# Patient Record
Sex: FEMALE | URBAN - METROPOLITAN AREA
[De-identification: names, ages, dates, MRNs, and addresses within clinical notes are randomized per-mention and may not be internally consistent; named-entity substitution may affect disease eponyms.]

---

## 2017-09-05 ENCOUNTER — IMPORTED ENCOUNTER (OUTPATIENT)
Dept: URBAN - METROPOLITAN AREA CLINIC 9 | Facility: CLINIC | Age: 42
End: 2017-09-05

## 2017-10-17 ENCOUNTER — IMPORTED ENCOUNTER (OUTPATIENT)
Dept: URBAN - METROPOLITAN AREA CLINIC 9 | Facility: CLINIC | Age: 42
End: 2017-10-17

## 2017-12-12 ENCOUNTER — IMPORTED ENCOUNTER (OUTPATIENT)
Dept: URBAN - METROPOLITAN AREA CLINIC 9 | Facility: CLINIC | Age: 42
End: 2017-12-12

## 2018-01-16 ENCOUNTER — IMPORTED ENCOUNTER (OUTPATIENT)
Dept: URBAN - METROPOLITAN AREA CLINIC 9 | Facility: CLINIC | Age: 43
End: 2018-01-16

## 2018-01-23 ENCOUNTER — IMPORTED ENCOUNTER (OUTPATIENT)
Dept: URBAN - METROPOLITAN AREA CLINIC 9 | Facility: CLINIC | Age: 43
End: 2018-01-23

## 2018-03-13 ENCOUNTER — IMPORTED ENCOUNTER (OUTPATIENT)
Dept: URBAN - METROPOLITAN AREA CLINIC 9 | Facility: CLINIC | Age: 43
End: 2018-03-13

## 2018-11-05 NOTE — PATIENT DISCUSSION
"The patient is given the patient education document: ""[facility]""

## 2019-04-16 ENCOUNTER — IMPORTED ENCOUNTER (OUTPATIENT)
Dept: URBAN - METROPOLITAN AREA CLINIC 9 | Facility: CLINIC | Age: 44
End: 2019-04-16

## 2019-06-17 ENCOUNTER — IMPORTED ENCOUNTER (OUTPATIENT)
Dept: URBAN - METROPOLITAN AREA CLINIC 9 | Facility: CLINIC | Age: 44
End: 2019-06-17

## 2020-06-19 ENCOUNTER — IMPORTED ENCOUNTER (OUTPATIENT)
Dept: URBAN - METROPOLITAN AREA CLINIC 9 | Facility: CLINIC | Age: 45
End: 2020-06-19

## 2021-10-16 ASSESSMENT — VISUAL ACUITY
OD_SC: 20/25 + SN
OD_SC: 20/20 SN
OD_SC: 20/25 SN
OS_CC: 20/20 SN
OD_CC: 20/25 SN
OS_SC: 20/100 SN
OD_SC: 20/30 SN
OS_SC: 20/25 SN
OD_SC: 20/25 SN
OS_CC: 20/25 SN
OS_SC: 20/60 SN
OD_SC: 20/25 + SN
OD_CC: 20/20 SN
OS_SC: 20/25 - SN
OS_SC: 20/25 SN
OS_SC: 20/70 - SN
OS_SC: 20/40 SN
OD_SC: 20/20 -2 SN
OS_SC: 20/20 -2 SN
OD_SC: 20/20 - SN
OS_PH: 20/40 - SN
OD_SC: 20/20 SN
OS_SC: 20/25 - SN
OS_PH: 20/30 SN

## 2021-10-16 ASSESSMENT — TONOMETRY
OS_IOP_MMHG: 14
OD_IOP_MMHG: 21
OD_IOP_MMHG: 16
OD_IOP_MMHG: 17
OS_IOP_MMHG: 17
OS_IOP_MMHG: 19
OD_IOP_MMHG: 14
OD_IOP_MMHG: 18
OS_IOP_MMHG: 15
OS_IOP_MMHG: 14

## 2022-07-01 RX ORDER — TELMISARTAN 80 MG/1
TABLET ORAL
COMMUNITY

## 2022-07-01 RX ORDER — LEVOTHYROXINE SODIUM 0.12 MG/1
TABLET ORAL
COMMUNITY

## 2022-07-01 RX ORDER — RIVAROXABAN 10 MG/1
TABLET, FILM COATED ORAL
COMMUNITY

## 2022-07-01 RX ORDER — QUETIAPINE FUMARATE 25 MG/1
TABLET, FILM COATED ORAL
COMMUNITY

## 2022-07-01 RX ORDER — CYCLOSPORINE 0.5 MG/ML
EMULSION OPHTHALMIC
COMMUNITY
Start: 2022-05-16

## 2022-07-01 RX ORDER — FERROUS SULFATE 325(65) MG
TABLET ORAL
COMMUNITY

## 2022-07-01 RX ORDER — CLONAZEPAM 0.5 MG/1
TABLET ORAL
COMMUNITY

## 2022-07-01 RX ORDER — POTASSIUM CHLORIDE 20 MEQ/1
TABLET, EXTENDED RELEASE ORAL
COMMUNITY

## 2022-07-01 RX ORDER — FUROSEMIDE 40 MG/1
TABLET ORAL
COMMUNITY
End: 2022-10-04 | Stop reason: SDUPTHER

## 2022-07-01 RX ORDER — LAMOTRIGINE 100 MG/1
TABLET ORAL
COMMUNITY

## 2022-07-01 RX ORDER — TRETINOIN 0.25 MG/G
GEL TOPICAL
COMMUNITY
Start: 2022-05-16

## 2022-08-10 PROBLEM — Z98.890 STATUS POST ARTHROSCOPY OF RIGHT SHOULDER: Status: ACTIVE | Noted: 2022-08-10

## 2022-09-01 PROBLEM — R21 RASH: Status: ACTIVE | Noted: 2022-09-01

## 2022-09-01 PROBLEM — I87.2 CHRONIC VENOUS INSUFFICIENCY: Status: ACTIVE | Noted: 2022-09-01

## 2022-09-01 PROBLEM — M54.9 BACKACHE: Status: ACTIVE | Noted: 2022-09-01

## 2022-09-01 PROBLEM — F90.9 ATTENTION DEFICIT HYPERACTIVITY DISORDER: Status: ACTIVE | Noted: 2022-09-01

## 2022-09-01 PROBLEM — M20.21 HALLUX RIGIDUS OF RIGHT FOOT: Status: ACTIVE | Noted: 2022-09-01

## 2022-09-01 PROBLEM — M79.673 FOOT PAIN: Status: ACTIVE | Noted: 2022-09-01

## 2022-09-01 PROBLEM — M47.817 LUMBOSACRAL SPONDYLOSIS WITHOUT MYELOPATHY: Status: ACTIVE | Noted: 2022-09-01

## 2022-09-01 PROBLEM — F32.9 MAJOR DEPRESSIVE DISORDER WITH CURRENT ACTIVE EPISODE: Status: ACTIVE | Noted: 2022-09-01

## 2022-09-01 PROBLEM — N64.89 BREAST ASYMMETRY: Status: ACTIVE | Noted: 2022-09-01

## 2022-09-01 PROBLEM — M75.120 FULL THICKNESS ROTATOR CUFF TEAR: Status: ACTIVE | Noted: 2022-01-13

## 2022-09-01 PROBLEM — F31.9 BIPOLAR AFFECTIVE DISORDER (HCC): Status: ACTIVE | Noted: 2022-09-01

## 2022-09-01 PROBLEM — E66.01 OBESITY, MORBID, BMI 40.0-49.9 (HCC): Status: ACTIVE | Noted: 2022-09-01

## 2022-09-01 PROBLEM — R60.9 EDEMA: Status: ACTIVE | Noted: 2022-09-01

## 2022-09-01 PROBLEM — H53.9 VISUAL DISTURBANCE: Status: ACTIVE | Noted: 2022-09-01

## 2022-09-01 PROBLEM — M19.019 ARTHRITIS OF STERNOCLAVICULAR JOINT: Status: ACTIVE | Noted: 2022-09-01

## 2022-09-01 PROBLEM — G89.29 CHRONIC KNEE PAIN AFTER TOTAL REPLACEMENT OF LEFT KNEE JOINT: Status: ACTIVE | Noted: 2021-11-18

## 2022-09-01 PROBLEM — G56.30 RADIAL TUNNEL SYNDROME: Status: ACTIVE | Noted: 2022-09-01

## 2022-09-01 PROBLEM — Z96.652 CHRONIC KNEE PAIN AFTER TOTAL REPLACEMENT OF LEFT KNEE JOINT: Status: ACTIVE | Noted: 2021-11-18

## 2022-09-01 PROBLEM — M21.6X2 OTHER ACQUIRED DEFORMITIES OF LEFT FOOT: Status: ACTIVE | Noted: 2022-09-01

## 2022-09-01 PROBLEM — M25.579 ANKLE PAIN: Status: ACTIVE | Noted: 2022-09-01

## 2022-09-01 PROBLEM — F43.10 POSTTRAUMATIC STRESS DISORDER: Status: ACTIVE | Noted: 2022-09-01

## 2022-09-01 PROBLEM — M50.90 DISORDER OF INTERVERTEBRAL DISC OF CERVICAL SPINE: Status: ACTIVE | Noted: 2022-09-01

## 2022-09-01 PROBLEM — M72.2 PLANTAR FASCIAL FIBROMATOSIS: Status: ACTIVE | Noted: 2022-09-01

## 2022-09-01 PROBLEM — D50.9 IRON DEFICIENCY ANEMIA: Status: ACTIVE | Noted: 2022-09-01

## 2022-09-01 PROBLEM — K82.4 CHOLESTEROLOSIS OF GALLBLADDER: Status: ACTIVE | Noted: 2022-09-01

## 2022-09-01 PROBLEM — M25.519 SHOULDER PAIN: Status: ACTIVE | Noted: 2022-01-13

## 2022-09-01 PROBLEM — K21.9 GASTROESOPHAGEAL REFLUX DISEASE: Status: ACTIVE | Noted: 2022-09-01

## 2022-09-01 PROBLEM — G56.23 CUBITAL TUNNEL SYNDROME, BILATERAL: Status: ACTIVE | Noted: 2022-09-01

## 2022-09-01 PROBLEM — N92.6 IRREGULAR PERIODS/MENSTRUAL CYCLES: Status: ACTIVE | Noted: 2022-09-01

## 2022-09-01 PROBLEM — E78.5 HYPERLIPIDEMIA: Status: ACTIVE | Noted: 2022-09-01

## 2022-09-01 PROBLEM — M77.9 TENDINITIS: Status: ACTIVE | Noted: 2022-09-01

## 2022-09-01 PROBLEM — M92.61 HAGLUND'S DEFORMITY OF RIGHT HEEL: Status: ACTIVE | Noted: 2022-09-01

## 2022-09-01 PROBLEM — E87.6 HYPOKALEMIA: Status: ACTIVE | Noted: 2022-09-01

## 2022-09-01 PROBLEM — R56.9 SEIZURE (HCC): Status: ACTIVE | Noted: 2022-09-01

## 2022-09-01 PROBLEM — F41.9 ANXIETY: Status: ACTIVE | Noted: 2022-09-01

## 2022-09-01 PROBLEM — E66.9 OBESITY: Status: ACTIVE | Noted: 2022-09-01

## 2022-09-01 PROBLEM — F33.2 SEVERE EPISODE OF RECURRENT MAJOR DEPRESSIVE DISORDER, WITHOUT PSYCHOTIC FEATURES (HCC): Status: ACTIVE | Noted: 2022-09-01

## 2022-09-01 PROBLEM — M20.10 ACQUIRED HALLUX VALGUS: Status: ACTIVE | Noted: 2022-09-01

## 2022-09-01 PROBLEM — M21.6X1 OTHER ACQUIRED DEFORMITIES OF RIGHT FOOT: Status: ACTIVE | Noted: 2022-09-01

## 2022-09-01 PROBLEM — F31.9 BIPOLAR DISORDER (HCC): Status: ACTIVE | Noted: 2022-09-01

## 2022-09-01 PROBLEM — Z96.659 ARTIFICIAL KNEE JOINT PRESENT: Status: ACTIVE | Noted: 2022-09-01

## 2022-09-01 PROBLEM — B35.1 ONYCHOMYCOSIS: Status: ACTIVE | Noted: 2022-09-01

## 2022-09-01 PROBLEM — M75.121 NONTRAUMATIC COMPLETE TEAR OF RIGHT ROTATOR CUFF: Status: ACTIVE | Noted: 2022-09-01

## 2022-09-01 PROBLEM — G56.02 CARPAL TUNNEL SYNDROME ON LEFT: Status: ACTIVE | Noted: 2022-09-01

## 2022-09-01 PROBLEM — M47.9 SPONDYLOSIS: Status: ACTIVE | Noted: 2022-09-01

## 2022-09-01 PROBLEM — G47.33 OSA (OBSTRUCTIVE SLEEP APNEA): Status: ACTIVE | Noted: 2022-09-01

## 2022-09-01 PROBLEM — G56.20 LESION OF ULNAR NERVE: Status: ACTIVE | Noted: 2022-09-01

## 2022-09-01 PROBLEM — M20.12 HALLUX VALGUS (ACQUIRED), LEFT FOOT: Status: ACTIVE | Noted: 2022-09-01

## 2022-09-01 PROBLEM — M72.2 PLANTAR FASCIITIS: Status: ACTIVE | Noted: 2022-09-01

## 2022-09-01 PROBLEM — R53.83 FATIGUE: Status: ACTIVE | Noted: 2022-09-01

## 2022-09-01 PROBLEM — N93.9 ABNORMAL UTERINE BLEEDING (AUB): Status: ACTIVE | Noted: 2022-09-01

## 2022-09-01 PROBLEM — M47.816 SPONDYLOSIS OF LUMBAR SPINE: Status: ACTIVE | Noted: 2022-09-01

## 2022-09-01 PROBLEM — M20.11 HALLUX VALGUS OF RIGHT FOOT: Status: ACTIVE | Noted: 2022-09-01

## 2022-09-01 PROBLEM — M17.0 ARTHRITIS OF BOTH KNEES: Status: ACTIVE | Noted: 2022-09-01

## 2022-09-01 PROBLEM — R92.8 ABNORMAL FINDINGS ON DIAGNOSTIC IMAGING OF BREAST: Status: ACTIVE | Noted: 2022-09-01

## 2022-09-01 PROBLEM — M25.562 CHRONIC KNEE PAIN AFTER TOTAL REPLACEMENT OF LEFT KNEE JOINT: Status: ACTIVE | Noted: 2021-11-18

## 2022-09-01 PROBLEM — H57.89 REDNESS OF BOTH EYES: Status: ACTIVE | Noted: 2022-09-01

## 2022-09-01 PROBLEM — M19.90 OSTEOARTHRITIS: Status: ACTIVE | Noted: 2022-09-01

## 2022-09-01 PROBLEM — G56.00 CARPAL TUNNEL SYNDROME: Status: ACTIVE | Noted: 2022-09-01

## 2022-09-01 PROBLEM — J30.9 ALLERGIC RHINITIS: Status: ACTIVE | Noted: 2022-09-01

## 2022-09-01 PROBLEM — G47.419 PRIMARY NARCOLEPSY WITHOUT CATAPLEXY: Status: ACTIVE | Noted: 2022-09-01

## 2022-09-01 PROBLEM — M25.476 SWELLING OF FIRST METATARSOPHALANGEAL (MTP) JOINT: Status: ACTIVE | Noted: 2022-09-01

## 2022-09-01 PROBLEM — M25.561 PAIN IN JOINT OF RIGHT KNEE: Status: ACTIVE | Noted: 2021-11-18

## 2022-09-01 PROBLEM — J40 BRONCHITIS: Status: ACTIVE | Noted: 2022-09-01

## 2022-09-01 PROBLEM — L81.9 ABNORMAL PIGMENTATION OF SKIN: Status: ACTIVE | Noted: 2022-09-01

## 2022-09-01 PROBLEM — R30.0 DYSURIA: Status: ACTIVE | Noted: 2022-09-01

## 2022-09-01 PROBLEM — D64.9 ANEMIA: Status: ACTIVE | Noted: 2022-09-01

## 2022-09-01 PROBLEM — M77.11 LATERAL EPICONDYLITIS OF RIGHT ELBOW: Status: ACTIVE | Noted: 2022-09-01

## 2022-09-01 PROBLEM — M47.22 CERVICAL SPONDYLOSIS WITH RADICULOPATHY: Status: ACTIVE | Noted: 2022-09-01

## 2022-09-01 PROBLEM — F50.81 BINGE EATING DISORDER: Status: ACTIVE | Noted: 2022-09-01

## 2022-09-01 PROBLEM — E03.9 HYPOTHYROIDISM: Status: ACTIVE | Noted: 2022-09-01

## 2022-09-01 PROBLEM — M22.2X9 PATELLOFEMORAL STRESS SYNDROME: Status: ACTIVE | Noted: 2021-11-18

## 2022-09-01 PROBLEM — E55.9 VITAMIN D DEFICIENCY: Status: ACTIVE | Noted: 2022-09-01

## 2022-09-01 PROBLEM — G56.01 CARPAL TUNNEL SYNDROME OF RIGHT WRIST: Status: ACTIVE | Noted: 2022-09-01

## 2022-09-01 PROBLEM — I10 HYPERTENSION: Status: ACTIVE | Noted: 2022-09-01

## 2022-09-01 PROBLEM — Z96.659 HISTORY OF TOTAL KNEE REPLACEMENT: Status: ACTIVE | Noted: 2021-11-18

## 2022-09-01 PROBLEM — M17.9 OSTEOARTHRITIS OF KNEE: Status: ACTIVE | Noted: 2021-11-18

## 2022-09-01 PROBLEM — D72.819 LEUKOPENIA: Status: ACTIVE | Noted: 2022-09-01

## 2022-09-06 PROBLEM — Z98.890 STATUS POST ARTHROSCOPY OF RIGHT SHOULDER: Status: ACTIVE | Noted: 2022-09-06

## 2022-09-15 PROBLEM — M25.519 SHOULDER PAIN: Status: RESOLVED | Noted: 2022-01-13 | Resolved: 2022-09-15

## 2022-09-15 PROBLEM — M25.579 ANKLE PAIN: Status: RESOLVED | Noted: 2022-09-01 | Resolved: 2022-09-15

## 2022-09-15 PROBLEM — E66.9 OBESITY: Status: RESOLVED | Noted: 2022-09-01 | Resolved: 2022-09-15

## 2022-09-15 PROBLEM — F33.2 SEVERE EPISODE OF RECURRENT MAJOR DEPRESSIVE DISORDER, WITHOUT PSYCHOTIC FEATURES (HCC): Status: RESOLVED | Noted: 2022-09-01 | Resolved: 2022-09-15

## 2022-09-15 PROBLEM — D64.9 ANEMIA: Status: RESOLVED | Noted: 2022-09-01 | Resolved: 2022-09-15

## 2022-09-15 PROBLEM — M75.120 FULL THICKNESS ROTATOR CUFF TEAR: Status: RESOLVED | Noted: 2022-01-13 | Resolved: 2022-09-15

## 2022-09-15 PROBLEM — M21.6X1 OTHER ACQUIRED DEFORMITIES OF RIGHT FOOT: Status: RESOLVED | Noted: 2022-09-01 | Resolved: 2022-09-15

## 2022-09-15 PROBLEM — M21.6X2 OTHER ACQUIRED DEFORMITIES OF LEFT FOOT: Status: RESOLVED | Noted: 2022-09-01 | Resolved: 2022-09-15

## 2022-09-15 PROBLEM — H57.89 REDNESS OF BOTH EYES: Status: RESOLVED | Noted: 2022-09-01 | Resolved: 2022-09-15

## 2022-09-15 PROBLEM — R21 RASH: Status: RESOLVED | Noted: 2022-09-01 | Resolved: 2022-09-15

## 2022-09-15 PROBLEM — F32.9 MAJOR DEPRESSIVE DISORDER WITH CURRENT ACTIVE EPISODE: Status: RESOLVED | Noted: 2022-09-01 | Resolved: 2022-09-15

## 2022-09-15 PROBLEM — G56.20 LESION OF ULNAR NERVE: Status: RESOLVED | Noted: 2022-09-01 | Resolved: 2022-09-15

## 2022-09-15 PROBLEM — H53.9 VISUAL DISTURBANCE: Status: RESOLVED | Noted: 2022-09-01 | Resolved: 2022-09-15

## 2022-09-15 PROBLEM — M79.673 FOOT PAIN: Status: RESOLVED | Noted: 2022-09-01 | Resolved: 2022-09-15

## 2022-09-16 PROBLEM — M20.11 ACQUIRED HALLUX VALGUS OF BOTH FEET: Status: ACTIVE | Noted: 2022-09-01

## 2022-09-16 PROBLEM — L81.9 ABNORMAL PIGMENTATION OF SKIN: Status: RESOLVED | Noted: 2022-09-01 | Resolved: 2022-09-16

## 2022-09-16 PROBLEM — M17.0 ARTHRITIS OF BOTH KNEES: Status: RESOLVED | Noted: 2022-09-01 | Resolved: 2022-09-16

## 2022-09-16 PROBLEM — R31.9 HEMATURIA: Status: ACTIVE | Noted: 2022-09-16

## 2022-09-16 PROBLEM — F31.9 BIPOLAR DISORDER (HCC): Status: RESOLVED | Noted: 2022-09-01 | Resolved: 2022-09-16

## 2022-09-16 PROBLEM — F31.9 BIPOLAR AFFECTIVE DISORDER (HCC): Status: RESOLVED | Noted: 2022-09-01 | Resolved: 2022-09-16

## 2022-09-16 PROBLEM — K82.4 CHOLESTEROLOSIS OF GALLBLADDER: Status: RESOLVED | Noted: 2022-09-01 | Resolved: 2022-09-16

## 2022-09-16 PROBLEM — G56.30 RADIAL TUNNEL SYNDROME: Status: RESOLVED | Noted: 2022-09-01 | Resolved: 2022-09-16

## 2022-09-16 PROBLEM — R59.1 LYMPHADENOPATHY OF HEAD AND NECK: Status: ACTIVE | Noted: 2022-09-16

## 2022-09-16 PROBLEM — R30.0 DYSURIA: Status: RESOLVED | Noted: 2022-09-01 | Resolved: 2022-09-16

## 2022-09-16 PROBLEM — M72.2 PLANTAR FASCIAL FIBROMATOSIS: Status: RESOLVED | Noted: 2022-09-01 | Resolved: 2022-09-16

## 2022-09-16 PROBLEM — M54.9 BACKACHE: Status: RESOLVED | Noted: 2022-09-01 | Resolved: 2022-09-16

## 2022-09-16 PROBLEM — Z96.652 HISTORY OF TOTAL LEFT KNEE REPLACEMENT: Status: ACTIVE | Noted: 2021-11-18

## 2022-09-16 PROBLEM — M50.90 DISORDER OF INTERVERTEBRAL DISC OF CERVICAL SPINE: Status: RESOLVED | Noted: 2022-09-01 | Resolved: 2022-09-16

## 2022-09-16 PROBLEM — R92.8 ABNORMAL FINDINGS ON DIAGNOSTIC IMAGING OF BREAST: Status: RESOLVED | Noted: 2022-09-01 | Resolved: 2022-09-16

## 2022-09-16 PROBLEM — R56.9 SEIZURE (HCC): Status: RESOLVED | Noted: 2022-09-01 | Resolved: 2022-09-16

## 2022-09-16 PROBLEM — R68.2 DRY MOUTH: Status: ACTIVE | Noted: 2022-09-16

## 2022-09-16 PROBLEM — M25.476 SWELLING OF FIRST METATARSOPHALANGEAL (MTP) JOINT: Status: RESOLVED | Noted: 2022-09-01 | Resolved: 2022-09-16

## 2022-09-16 PROBLEM — G56.02 CARPAL TUNNEL SYNDROME ON LEFT: Status: RESOLVED | Noted: 2022-09-01 | Resolved: 2022-09-16

## 2022-09-16 PROBLEM — N64.89 BREAST ASYMMETRY: Status: RESOLVED | Noted: 2022-09-01 | Resolved: 2022-09-16

## 2022-09-16 PROBLEM — H04.123 DRY EYES: Status: ACTIVE | Noted: 2022-09-16

## 2022-09-16 PROBLEM — M85.852 OSTEOPENIA OF LEFT THIGH: Status: ACTIVE | Noted: 2022-09-16

## 2022-09-16 PROBLEM — M20.12 HALLUX VALGUS (ACQUIRED), LEFT FOOT: Status: RESOLVED | Noted: 2022-09-01 | Resolved: 2022-09-16

## 2022-09-16 PROBLEM — M22.2X9 PATELLOFEMORAL STRESS SYNDROME: Status: RESOLVED | Noted: 2021-11-18 | Resolved: 2022-09-16

## 2022-09-16 PROBLEM — M20.11 HALLUX VALGUS OF RIGHT FOOT: Status: RESOLVED | Noted: 2022-09-01 | Resolved: 2022-09-16

## 2022-09-16 PROBLEM — E87.6 HYPOKALEMIA: Status: RESOLVED | Noted: 2022-09-01 | Resolved: 2022-09-16

## 2022-09-16 PROBLEM — R53.83 FATIGUE: Status: RESOLVED | Noted: 2022-09-01 | Resolved: 2022-09-16

## 2022-09-16 PROBLEM — Z96.659 ARTIFICIAL KNEE JOINT PRESENT: Status: RESOLVED | Noted: 2022-09-01 | Resolved: 2022-09-16

## 2022-09-16 PROBLEM — J40 BRONCHITIS: Status: RESOLVED | Noted: 2022-09-01 | Resolved: 2022-09-16

## 2022-09-16 PROBLEM — F41.9 ANXIETY: Status: RESOLVED | Noted: 2022-09-01 | Resolved: 2022-09-16

## 2022-09-16 PROBLEM — R71.8 MICROCYTOSIS: Status: ACTIVE | Noted: 2022-09-16

## 2022-09-16 PROBLEM — M19.90 OSTEOARTHRITIS: Status: RESOLVED | Noted: 2022-09-01 | Resolved: 2022-09-16

## 2022-09-16 PROBLEM — M17.0 PRIMARY OSTEOARTHRITIS OF BOTH KNEES: Status: ACTIVE | Noted: 2021-11-18

## 2022-09-16 PROBLEM — G56.03 BILATERAL CARPAL TUNNEL SYNDROME: Status: ACTIVE | Noted: 2022-09-01

## 2022-09-16 PROBLEM — M17.0 PRIMARY OSTEOARTHRITIS OF BOTH KNEES: Status: RESOLVED | Noted: 2021-11-18 | Resolved: 2022-09-16

## 2022-09-16 PROBLEM — G56.00 CARPAL TUNNEL SYNDROME: Status: RESOLVED | Noted: 2022-09-01 | Resolved: 2022-09-16

## 2022-09-16 PROBLEM — M47.9 SPONDYLOSIS: Status: RESOLVED | Noted: 2022-09-01 | Resolved: 2022-09-16

## 2022-09-16 PROBLEM — N92.6 IRREGULAR PERIODS/MENSTRUAL CYCLES: Status: RESOLVED | Noted: 2022-09-01 | Resolved: 2022-09-16

## 2022-09-16 PROBLEM — M67.90 TENDINOPATHY: Status: ACTIVE | Noted: 2022-09-01

## 2022-09-16 PROBLEM — M20.10 ACQUIRED HALLUX VALGUS: Status: RESOLVED | Noted: 2022-09-01 | Resolved: 2022-09-16

## 2022-09-16 PROBLEM — M20.21 HALLUX RIGIDUS OF RIGHT FOOT: Status: RESOLVED | Noted: 2022-09-01 | Resolved: 2022-09-16

## 2022-09-16 PROBLEM — R60.9 EDEMA: Status: RESOLVED | Noted: 2022-09-01 | Resolved: 2022-09-16

## 2022-09-16 PROBLEM — M25.561 PAIN IN JOINT OF RIGHT KNEE: Status: RESOLVED | Noted: 2021-11-18 | Resolved: 2022-09-16

## 2022-09-17 PROBLEM — B35.1 ONYCHOMYCOSIS: Status: RESOLVED | Noted: 2022-09-01 | Resolved: 2022-09-17

## 2022-09-17 PROBLEM — E66.3 OVERWEIGHT (BMI 25.0-29.9): Status: ACTIVE | Noted: 2022-09-01

## 2022-09-17 PROBLEM — Z80.0 FAMILY HISTORY OF COLON CANCER: Status: ACTIVE | Noted: 2022-09-17

## 2022-09-17 PROBLEM — G47.419 PRIMARY NARCOLEPSY WITHOUT CATAPLEXY: Status: RESOLVED | Noted: 2022-09-01 | Resolved: 2022-09-17

## 2022-09-17 PROBLEM — D25.9 UTERINE FIBROID: Status: ACTIVE | Noted: 2022-09-17

## 2022-09-17 PROBLEM — F41.1 GAD (GENERALIZED ANXIETY DISORDER): Status: ACTIVE | Noted: 2022-09-17

## 2022-10-17 PROBLEM — M79.7 FIBROMYALGIA: Status: ACTIVE | Noted: 2022-10-17

## 2025-04-21 ENCOUNTER — NEW PATIENT (OUTPATIENT)
Age: 50
End: 2025-04-21

## 2025-04-21 DIAGNOSIS — H04.123: ICD-10-CM

## 2025-04-21 DIAGNOSIS — H02.412: ICD-10-CM

## 2025-04-21 DIAGNOSIS — H25.13: ICD-10-CM

## 2025-04-21 PROCEDURE — 92004 COMPRE OPH EXAM NEW PT 1/>: CPT

## 2025-04-21 PROCEDURE — 92015 DETERMINE REFRACTIVE STATE: CPT
